# Patient Record
Sex: FEMALE | Race: WHITE
[De-identification: names, ages, dates, MRNs, and addresses within clinical notes are randomized per-mention and may not be internally consistent; named-entity substitution may affect disease eponyms.]

---

## 2019-11-08 ENCOUNTER — HOSPITAL ENCOUNTER (OUTPATIENT)
Dept: HOSPITAL 33 - MED SURG | Age: 39
Setting detail: OBSERVATION
LOS: 2 days | Discharge: HOME | End: 2019-11-10
Attending: INTERNAL MEDICINE | Admitting: INTERNAL MEDICINE
Payer: COMMERCIAL

## 2019-11-08 DIAGNOSIS — I95.1: Primary | ICD-10-CM

## 2019-11-08 DIAGNOSIS — R42: ICD-10-CM

## 2019-11-08 DIAGNOSIS — I10: ICD-10-CM

## 2019-11-08 LAB
ALBUMIN SERPL-MCNC: 3.9 G/DL (ref 3.5–5)
ALP SERPL-CCNC: 58 U/L (ref 38–126)
ALT SERPL-CCNC: 21 U/L (ref 0–35)
ANION GAP SERPL CALC-SCNC: 8.8 MEQ/L (ref 5–15)
AST SERPL QL: 29 U/L (ref 14–36)
BILIRUB BLD-MCNC: 0.5 MG/DL (ref 0.2–1.3)
BUN SERPL-MCNC: 22 MG/DL (ref 7–17)
CALCIUM SPEC-MCNC: 9.2 MG/DL (ref 8.4–10.2)
CHLORIDE SERPL-SCNC: 106 MMOL/L (ref 98–107)
CO2 SERPL-SCNC: 29 MMOL/L (ref 22–30)
CREAT SERPL-MCNC: 0.73 MG/DL (ref 0.52–1.04)
GLUCOSE SERPL-MCNC: 81 MG/DL (ref 74–106)
HCT VFR BLD AUTO: 38.8 % (ref 35–47)
HGB BLD-MCNC: 12.5 GM/DL (ref 12–16)
MAGNESIUM SERPL-MCNC: 1.8 MG/DL (ref 1.6–2.3)
MCH RBC QN AUTO: 28.1 PG (ref 26–32)
MCHC RBC AUTO-ENTMCNC: 32.2 G/DL (ref 32–36)
PLATELET # BLD AUTO: 246 K/MM3 (ref 150–450)
POTASSIUM SERPLBLD-SCNC: 4.2 MMOL/L (ref 3.5–5.1)
PROT SERPL-MCNC: 7 G/DL (ref 6.3–8.2)
RBC # BLD AUTO: 4.45 M/MM3 (ref 4.1–5.4)
SODIUM SERPL-SCNC: 140 MMOL/L (ref 137–145)
WBC # BLD AUTO: 5.7 K/MM3 (ref 4–10.5)

## 2019-11-08 PROCEDURE — G0378 HOSPITAL OBSERVATION PER HR: HCPCS

## 2019-11-08 PROCEDURE — 80053 COMPREHEN METABOLIC PANEL: CPT

## 2019-11-08 PROCEDURE — 83690 ASSAY OF LIPASE: CPT

## 2019-11-08 PROCEDURE — 93005 ELECTROCARDIOGRAM TRACING: CPT

## 2019-11-08 PROCEDURE — 83735 ASSAY OF MAGNESIUM: CPT

## 2019-11-08 PROCEDURE — 82150 ASSAY OF AMYLASE: CPT

## 2019-11-08 PROCEDURE — 70450 CT HEAD/BRAIN W/O DYE: CPT

## 2019-11-08 PROCEDURE — 85027 COMPLETE CBC AUTOMATED: CPT

## 2019-11-08 PROCEDURE — 93268 ECG RECORD/REVIEW: CPT

## 2019-11-08 PROCEDURE — 36415 COLL VENOUS BLD VENIPUNCTURE: CPT

## 2019-11-09 LAB
ALBUMIN SERPL-MCNC: 3.3 G/DL (ref 3.5–5)
ALP SERPL-CCNC: 53 U/L (ref 38–126)
ALT SERPL-CCNC: 16 U/L (ref 0–35)
AMYLASE SERPL-CCNC: 40 U/L (ref 30–110)
ANION GAP SERPL CALC-SCNC: 7 MEQ/L (ref 5–15)
AST SERPL QL: 20 U/L (ref 14–36)
BILIRUB BLD-MCNC: 0.4 MG/DL (ref 0.2–1.3)
BUN SERPL-MCNC: 13 MG/DL (ref 7–17)
CALCIUM SPEC-MCNC: 8 MG/DL (ref 8.4–10.2)
CHLORIDE SERPL-SCNC: 111 MMOL/L (ref 98–107)
CO2 SERPL-SCNC: 26 MMOL/L (ref 22–30)
CREAT SERPL-MCNC: 0.64 MG/DL (ref 0.52–1.04)
GLUCOSE SERPL-MCNC: 78 MG/DL (ref 74–106)
LIPASE SERPL-CCNC: 107 U/L (ref 23–300)
POTASSIUM SERPLBLD-SCNC: 3.9 MMOL/L (ref 3.5–5.1)
PROT SERPL-MCNC: 6.1 G/DL (ref 6.3–8.2)
SODIUM SERPL-SCNC: 141 MMOL/L (ref 137–145)

## 2019-11-09 RX ADMIN — HYDROCHLOROTHIAZIDE SCH MG: 25 TABLET ORAL at 11:53

## 2019-11-09 NOTE — XRAY
Indication: Dizziness and nausea.



Multiple contiguous axial images obtained through the head without contrast.



Comparison: None



Normal appearing brain parenchyma, ventricles, and bony calvarium.  Visualized

paranasal sinuses and mastoid air cells are clear.



Impression: Normal CT head without contrast exam.



Comment: Preliminary interpretation was made by [facility].  No discrepancy.



CTDI 61.96

## 2019-11-09 NOTE — PCM.SSS
History of Present Illness





- Chief Complaint


Chief Complaint: severe dizziness and syncopal episodes for last 1 day


History of Present Illness: 


 is a 38 year old female.With significant past medical history of 

recurrent orthostatic hypotension and syncopal episode, which workup has not 

reveal any significant findings, but more suggestive of autonomic hypotension.  

She was in her usual state of health and since yesterday night she passed out 

couple times and now she is feeling ready weak and dizzy.  She came to my office

, where she was also having a near syncopal episode, so she was admitted for 

further evaluation and the saline infusion.








- Review of Systems


Constitutional: No Fever, No Chills


Eyes: No Symptoms


Ears, Nose, & Throat: No Symptoms


Respiratory: No Cough, No Short Of Breath


Cardiac: Syncope, No Chest Pain, No Edema


Abdominal/Gastrointestinal: No Abdominal Pain, No Nausea, No Vomiting, No 

Diarrhea


Genitourinary Symptoms: No Dysuria


Musculoskeletal: No Back Pain, No Neck Pain


Skin: No Rash


Neurological: Dizziness, No Focal Weakness, No Sensory Changes


Psychological: No Symptoms


Endocrine: No Symptoms


Hematologic/Lymphatic: No Symptoms


Immunological/Allergic: No Symptoms





Medications & Allergies


Home Medications: 


 Home Medication List





Lisinopril/Hydrochlorothiazide [Lisinopril-Hctz 20-12.5 mg Tab] 1 tab PO DAILY 

16 [History Confirmed 19]








Allergies/Adverse Reactions: 


 Allergies











Allergy/AdvReac Type Severity Reaction Status Date / Time


 


No Known Drug Allergies Allergy   Verified 17 15:59














- Past Medical History


Past Medical History: Yes


Neurological History: Migraines


ENT History: No Pertinent History


Cardiac History: Hypertension


Respiratory History: No Pertinent History


Endocrine Medical History: No Pertinent History


Musculoskelatal History: No Pertinent History


GI Medical History: No Pertinent History


 History: No Pertinent History


Pyscho-Social History: No Pertinent History


Reproductive Disorders: No Pertinent History


Comment: Postural Orthostatic Tachycardial Syndrome





- Female History


Are you pregnant now?: No (tubal)





- Past Surgical History


Past Surgical History: Yes


Neuro Surgical History: No Pertinent History


Cardiac History: No Pertinent History


Respiratory Surgery: No Pertinent History


GI Surgical History: Appendectomy


Genitourinary Surgical Hx: No Pertinent History


Musculskeletal Surgical Hx: No Pertinent History


Female Surgical History:  Section, Tubal Ligation


Other Surgical History: spinal fusion L5-S1





- Social History


Smoking Status: Never smoker


Exposure to second hand smoke: No


Alcohol: None


Drug Use: none





- Physical Exam


Vital Signs: 


 Vital Signs - 24 hr











  Temp Pulse Resp BP Pulse Ox


 


 19 07:19  97.8 F  72  20  120/67  99


 


 19 04:14  98.1 F  72  16  109/63  97


 


 19 23:39  98.1 F  72  18  104/58  97


 


 19 20:00  98.0 F  87  17  117/58  96


 


 19 16:00  98.1 F  65  20  114/60  99


 


 19 12:30  98.1 F  66  18  142/72  99


 


 19 12:00  98.1 F  66  18  142/72  99











General Appearance: no apparent distress, alert


Neurologic Exam: alert, oriented x 3, cooperative, normal mood/affect, nml 

cerebellar function, nml station & gait, sensation nml, No motor deficits


Eye Exam: PERRL/EOMI, eyes nml inspection


Ears, Nose, Throat Exam: normal ENT inspection, TMs normal, pharynx normal, 

moist mucous membranes


Neck Exam: normal inspection, non-tender, supple, full range of motion


Respiratory Exam: normal breath sounds, lungs clear, No respiratory distress


Cardiovascular Exam: regular rate/rhythm, normal heart sounds, normal 

peripheral pulses


Gastrointestinal/Abdomen Exam: soft, normal bowel sounds, No tenderness, No mass


Back Exam: normal inspection, normal range of motion, No CVA tenderness, No 

vertebral tenderness


Extremity Exam: normal inspection, normal range of motion, pelvis stable


Skin Exam: normal color, warm, dry, No rash


Lymphatic Exam: No adenopathy





Results





- Labs


Lab/Micro Results: 


 Lab Results-Last 24 Hours











  19 Range/Units





  14:45 14:45 


 


WBC  5.7   (4.0-10.5)  K/mm3


 


RBC  4.45   (4.1-5.4)  M/mm3


 


Hgb  12.5   (12.0-16.0)  gm/dl


 


Hct  38.8   (35-47)  %


 


MCV  87.2   ()  fl


 


MCH  28.1   (26-32)  pg


 


MCHC  32.2   (32-36)  g/dl


 


RDW  13.7   (11.5-14.0)  %


 


Plt Count  246   (150-450)  K/mm3


 


MPV  11.5 H   (6-9.5)  fl


 


Sodium   140  (137-145)  mmol/L


 


Potassium   4.2  (3.5-5.1)  mmol/L


 


Chloride   106  ()  mmol/L


 


Carbon Dioxide   29  (22-30)  mmol/L


 


Anion Gap   8.8  (5-15)  MEQ/L


 


BUN   22 H  (7-17)  mg/dL


 


Creatinine   0.73  (0.52-1.04)  mg/dL


 


Estimated GFR   > 60.0  ML/MIN


 


Glucose   81  ()  mg/dL


 


Calcium   9.2  (8.4-10.2)  mg/dL


 


Magnesium   1.8  (1.6-2.3)  mg/dL


 


Total Bilirubin   0.50  (0.2-1.3)  mg/dL


 


AST   29  (14-36)  U/L


 


ALT   21  (0-35)  U/L


 


Alkaline Phosphatase   58  ()  U/L


 


Serum Total Protein   7.0  (6.3-8.2)  g/dL


 


Albumin   3.9  (3.5-5.0)  g/dL














- Radiology Impressions


Radiology Exams & Impressions: 





RAD/CHEST 2 VIEWS (PA AND LAT)


Indication: Left rib pain and migraines.





Comparison: 2013





PA/L chest again demonstrates normal heart, lungs, and bony thorax with a few


scattered calcified granulomas.





- Other Procedures and Tests


 Respiratory Therapy





19 08:02


EKG OM.NOW 














Assessment/Plan


(1) Autonomic postural hypotension


Current Visit: Yes   Status: Acute   


Assessment & Plan: 


 Chief Complaint





Diagnosis                        Orthostatic hypotension





 Allergies











Allergy/AdvReac Type Severity Reaction Status Date / Time


 


No Known Drug Allergies Allergy   Verified 17 15:59








 Vital Signs (Last 24 hours)











  Temp Pulse Resp BP Pulse Ox


 


 19 07:19  97.8 F  72  20  120/67  99


 


 19 04:14  98.1 F  72  16  109/63  97


 


 19 23:39  98.1 F  72  18  104/58  97


 


 19 20:00  98.0 F  87  17  117/58  96


 


 19 16:00  98.1 F  65  20  114/60  99


 


 19 12:30  98.1 F  66  18  142/72  99


 


 19 12:00  98.1 F  66  18  142/72  99








 Current Medications











Generic Name Dose Route Start Last Admin





  Trade Name Koltonq  PRN Reason Stop Dose Admin


 


Hydrochlorothiazide  12.5 mg  19 10:00  





  Hydrodiuril 25 Mg***  PO  19 09:59  





  DAILY RITCHIE   





     





     





     





     


 


Sodium Chloride  1,000 mls @ 150 mls/hr  19 21:00  19 05:20





  Sodium Chloride 0.9% 1000 Ml  IV  19 20:59  150 mls/hr





  .Q6H40M RITCHIE   Administration





     





     





     





     


 


Lisinopril  20 mg  19 10:00  





  Zestril 20 Mg***  PO  19 09:59  





  DAILY RITCHIE   





     





     





     





     














Discontinued Medications














Generic Name Dose Route Start Last Admin





  Trade Name Koltonq  PRN Reason Stop Dose Admin


 


Sodium Chloride  1,000 mls @ 999 mls/hr  19 15:30  19 17:06





  Sodium Chloride 0.9% 1000 Ml  IV  19 17:30  999 mls/hr





  .Q1H1M RITCHIE   Administration





     





     





     





     








 Intake & Output (Last 24 hours)











 19





 11:59 11:59 11:59 11:59


 


Intake Total    4145


 


Output Total    600


 


Balance    3545


 


Weight    113.1 kg








 Laboratory Results (Last 24 hours)











  19





  14:45 14:45


 


WBC   5.7


 


RBC   4.45


 


Hgb   12.5


 


Hct   38.8


 


MCV   87.2


 


MCH   28.1


 


MCHC   32.2


 


RDW   13.7


 


Plt Count   246


 


MPV   11.5 H


 


Sodium  140 


 


Potassium  4.2 


 


Chloride  106 


 


Carbon Dioxide  29 


 


Anion Gap  8.8 


 


BUN  22 H 


 


Creatinine  0.73 


 


Estimated GFR  > 60.0 


 


Glucose  81 


 


Calcium  9.2 


 


Magnesium  1.8 


 


Total Bilirubin  0.50 


 


AST  29 


 


ALT  21 


 


Alkaline Phosphatase  58 


 


Serum Total Protein  7.0 


 


Albumin  3.9 








 Orders (Last 24 hours)











 Category Date Time Status


 


 Telemetry q6h Care  19 00:39 Active


 


 Regular Diet Diet  19 Lunch Active


 


 CBC Urgent Lab  19 14:45 Completed


 


 CMP Urgent Lab  19 14:45 Completed


 


 MAG [MAGNESIUM] Urgent Lab  19 14:45 Completed


 


 Hydrochlorothiazide 25 mg*** [hydroDIURIL 25 MG***] Med  19 10:00 Active





 12.5 mg PO DAILY   


 


 Lisinopril 20 mg*** [Zestril 20 MG***] Med  19 10:00 Active





 20 mg PO DAILY   


 


 NaCl 0.9% 1000 ml [Sodium Chloride 0.9% 1000 ML] 1,000 Med  19 21:00 

Active





 ml   





  mls/hr   


 


 NaCl 0.9% 1000 ml [Sodium Chloride 0.9% 1000 ML] 1,000 Med  19 15:30 

Discontinued





 ml   





  mls/hr   


 


 EKG OM.NOW RT  19 08:02 Active








 Patient Care Notes (Last 24 hours)





19 21:05 (created 19 01:09) Nursing Note by Tarun Velasquez


Pt c/o heavy pain in her chest, 5/10 and headache.  States feels like a weight 

is on her chest.  /70, HR 73, O2 sat 99% on RA.  Episode was subsiding by 

the time her vitals were taken.  Upon reassessment, pt states that the pain is 

gone.  She states that this is the same feeling she always gets with these 

episodes.





Initialized on 19 01:09 - END OF NOTE














Code(s): I95.1 - ORTHOSTATIC HYPOTENSION   





(2) Syncope due to orthostatic hypotension


Current Visit: Yes   Status: Acute   Code(s): I95.1 - ORTHOSTATIC HYPOTENSION   





Hospital Summary





- Hospital Course


Hospital Course: 





 Last Vital Signs











Temp  97.8 F   19 07:19


 


Pulse  72   19 07:19


 


Resp  20   19 07:19


 


BP  120/67   19 07:19


 


Pulse Ox  99   19 07:19








Allergies





No Known Drug Allergies Allergy (Verified 17 15:59)


 





Active Medications





Hydrochlorothiazide (Hydrodiuril 25 Mg***)  12.5 mg PO DAILY RITCHIE


   Stop: 19 09:59


Sodium Chloride (Sodium Chloride 0.9% 1000 Ml)  1,000 mls @ 150 mls/hr IV 

.Q6H40M RITCHIE


   Stop: 19 20:59


   Last Admin: 19 05:20 Dose:  150 mls/hr


Lisinopril (Zestril 20 Mg***)  20 mg PO DAILY RITCHIE


   Stop: 19 09:59





 Intake & Output











 19





 11:59 11:59


 


Intake Total  4145


 


Output Total  600


 


Balance  3545


 


Weight  113.1 kg








 Orders





19 21:00


NaCl 0.9% 1000 ml [Sodium Chloride 0.9% 1000 ML] 1,000 ml  mls/hr 





19 Lunch


Regular Diet 





19 08:02


EKG OM.NOW 





19 10:00


Hydrochlorothiazide 25 mg*** [hydroDIURIL 25 MG***]   12.5 mg PO DAILY 


Lisinopril 20 mg*** [Zestril 20 MG***]   20 mg PO DAILY 








 Lab Tests











  19





  14:45 14:45


 


WBC  5.7 


 


RBC  4.45 


 


Hgb  12.5 


 


Hct  38.8 


 


MCV  87.2 


 


MCH  28.1 


 


MCHC  32.2 


 


RDW  13.7 


 


Plt Count  246 


 


MPV  11.5 H 


 


Sodium   140


 


Potassium   4.2


 


Chloride   106


 


Carbon Dioxide   29


 


Anion Gap   8.8


 


BUN   22 H


 


Creatinine   0.73


 


Estimated GFR   > 60.0


 


Glucose   81


 


Calcium   9.2


 


Magnesium   1.8


 


Total Bilirubin   0.50


 


AST   29


 


ALT   21


 


Alkaline Phosphatase   58


 


Serum Total Protein   7.0


 


Albumin   3.9














- Vitals & Intake/Output


Vital Signs: 


 Vital Signs











Temperature  97.8 F   19 07:19


 


Pulse Rate  72   19 07:19


 


Respiratory Rate  20   19 07:19


 


Blood Pressure  120/67   19 07:19


 


O2 Sat by Pulse Oximetry  99   19 07:19











Intake & Output: 


 Intake & Output











 19





 11:59 11:59 11:59 11:59


 


Intake Total    4145


 


Output Total    600


 


Balance    3545


 


Weight    113.1 kg














- Lab


Result Diagrams: 


 19 14:45





 19 14:45


Lab Results-Last 24 Hrs: 


 Lab Results-Last 24 Hours











  19 Range/Units





  14:45 14:45 


 


WBC  5.7   (4.0-10.5)  K/mm3


 


RBC  4.45   (4.1-5.4)  M/mm3


 


Hgb  12.5   (12.0-16.0)  gm/dl


 


Hct  38.8   (35-47)  %


 


MCV  87.2   ()  fl


 


MCH  28.1   (26-32)  pg


 


MCHC  32.2   (32-36)  g/dl


 


RDW  13.7   (11.5-14.0)  %


 


Plt Count  246   (150-450)  K/mm3


 


MPV  11.5 H   (6-9.5)  fl


 


Sodium   140  (137-145)  mmol/L


 


Potassium   4.2  (3.5-5.1)  mmol/L


 


Chloride   106  ()  mmol/L


 


Carbon Dioxide   29  (22-30)  mmol/L


 


Anion Gap   8.8  (5-15)  MEQ/L


 


BUN   22 H  (7-17)  mg/dL


 


Creatinine   0.73  (0.52-1.04)  mg/dL


 


Estimated GFR   > 60.0  ML/MIN


 


Glucose   81  ()  mg/dL


 


Calcium   9.2  (8.4-10.2)  mg/dL


 


Magnesium   1.8  (1.6-2.3)  mg/dL


 


Total Bilirubin   0.50  (0.2-1.3)  mg/dL


 


AST   29  (14-36)  U/L


 


ALT   21  (0-35)  U/L


 


Alkaline Phosphatase   58  ()  U/L


 


Serum Total Protein   7.0  (6.3-8.2)  g/dL


 


Albumin   3.9  (3.5-5.0)  g/dL














- Procedures and Test


Procedures and Tests throughout Hospitalization: 


 Therapy Orders & Screens





19 08:02


EKG OM.NOW 


   Comment: 


   Diagnosis: Orthostatic hypotension














- Discharge


Discharge Date: 19


Disposition: Home, Self-Care


Condition: Stable


Prescriptions: 


Continue


   Lisinopril/Hydrochlorothiazide [Lisinopril-Hctz 20-12.5 mg Tab] 1 tab PO 

DAILY


Follow up with: 


CLEMENTE CALERO MD [Primary Care Provider] - 19 2:00 pm (at [facility])


Forms:  Work/School Release Form

## 2019-11-10 VITALS — SYSTOLIC BLOOD PRESSURE: 131 MMHG | DIASTOLIC BLOOD PRESSURE: 66 MMHG | OXYGEN SATURATION: 100 % | HEART RATE: 66 BPM

## 2019-11-10 RX ADMIN — HYDROCHLOROTHIAZIDE SCH MG: 25 TABLET ORAL at 10:19

## 2019-11-10 RX ADMIN — MECLIZINE HYDROCHLORIDE SCH MG: 25 TABLET ORAL at 08:43

## 2019-11-10 RX ADMIN — MECLIZINE HYDROCHLORIDE SCH MG: 25 TABLET ORAL at 14:33

## 2019-11-10 RX ADMIN — MECLIZINE HYDROCHLORIDE SCH: 25 TABLET ORAL at 10:54

## 2019-11-10 NOTE — PCM.NOTE
Date and Time: 11/10/19  0759





Subjective Assessment: 





still some episodes of lightheadedness





- Review of Systems


Constitutional: No Fever, No Chills


Eyes: No Symptoms


Ears, Nose, & Throat: No Symptoms


Respiratory: No Cough, No Short Of Breath


Cardiac: No Chest Pain, No Edema, No Syncope


Abdominal/Gastrointestinal: No Abdominal Pain, No Nausea, No Vomiting, No 

Diarrhea


Genitourinary Symptoms: No Dysuria


Musculoskeletal: No Back Pain, No Neck Pain


Skin: No Rash


Neurological: No Dizziness, No Focal Weakness, No Sensory Changes


Psychological: No Symptoms


Endocrine: No Symptoms


Hematologic/Lymphatic: No Symptoms


Immunological/Allergic: No Symptoms





Objective Exam


General Appearance: no apparent distress, alert


Neurologic Exam: alert, oriented x 3, cooperative, normal mood/affect, nml 

cerebellar function, sensation nml, No motor deficits


Skin Exam: normal color, warm, dry


Eye Exam: PERRL, EOMI, eyes nml inspection


Ears, Nose, Throat Exam: normal ENT inspection, pharynx normal, moist mucous 

membranes


Neck Exam: normal inspection, non-tender, supple, full range of motion


Respiratory Exam: normal breath sounds, lungs clear, No respiratory distress


Cardiovascular Exam: regular rate/rhythm, normal heart sounds


Gastrointestinal/Abdomen Exam: soft, No tenderness, No mass


Extremity Exam: normal inspection, normal range of motion


Back Exam: normal inspection, normal range of motion, No CVA tenderness, No 

vertebral tenderness


Pelvic Exam: deferred


Rectal Exam: deferred





OBJECTIVE DATA


Vital Signs: 


 Vital Signs - 24 hr











  Temp Pulse Resp BP Pulse Ox


 


 11/10/19 07:03  98.0 F  74  17  128/72  99


 


 11/10/19 04:00    18  


 


 11/10/19 03:55  97.8 F  73  18  129/80  99


 


 11/10/19 00:00    16  


 


 11/09/19 23:26  97.9 F  83  16  101/52  99


 


 11/09/19 20:00    16  


 


 11/09/19 19:58  98.1 F  89  16  103/58  98


 


 11/09/19 16:03  97.8 F  60  20  133/79  99


 


 11/09/19 12:26   66  20  139/65  99








 Pain Assessment - Last Documented











Pain Intensity                 0


 


Pain Scale Used                0-10 Pain Scale











Intake and Output: 


 Intake & Output











 11/07/19 11/08/19 11/09/19 11/10/19





 11:59 11:59 11:59 11:59


 


Intake Total   4502 5416


 


Output Total   600 1500


 


Balance   3904 3944


 


Weight   113.1 kg 











Lab Results: 


 Lab Results-Last 24 Hours











  11/09/19 11/09/19 Range/Units





  12:49 12:49 


 


Sodium  141   (137-145)  mmol/L


 


Potassium  3.9   (3.5-5.1)  mmol/L


 


Chloride  111 H   ()  mmol/L


 


Carbon Dioxide  26   (22-30)  mmol/L


 


Anion Gap  7.0   (5-15)  MEQ/L


 


BUN  13   (7-17)  mg/dL


 


Creatinine  0.64   (0.52-1.04)  mg/dL


 


Estimated GFR  > 60.0   ML/MIN


 


Glucose  78   ()  mg/dL


 


Calcium  8.0 L   (8.4-10.2)  mg/dL


 


Total Bilirubin  0.40   (0.2-1.3)  mg/dL


 


AST  20   (14-36)  U/L


 


ALT  16   (0-35)  U/L


 


Alkaline Phosphatase  53   ()  U/L


 


Serum Total Protein  6.1 L   (6.3-8.2)  g/dL


 


Albumin  3.3 L   (3.5-5.0)  g/dL


 


Amylase   40  ()  U/L


 


Lipase   107  ()  U/L











Radiology Exams: 


 Radiology Procedures











 Category Date Time Status


 


 HEAD WITHOUT CONTRAST [CT] Urgent Exams  11/09/19 11:49 Completed














Assessment/Plan


(1) Autonomic postural hypotension


Current Visit: Yes   Status: Acute   Code(s): I95.1 - ORTHOSTATIC HYPOTENSION   





(2) Syncope due to orthostatic hypotension


Current Visit: Yes   Status: Acute   Code(s): I95.1 - ORTHOSTATIC HYPOTENSION

## 2020-10-15 ENCOUNTER — HOSPITAL ENCOUNTER (OUTPATIENT)
Dept: HOSPITAL 33 - MED SURG | Age: 40
Setting detail: OBSERVATION
LOS: 2 days | Discharge: HOME | End: 2020-10-17
Attending: INTERNAL MEDICINE | Admitting: INTERNAL MEDICINE
Payer: COMMERCIAL

## 2020-10-15 DIAGNOSIS — Z79.899: ICD-10-CM

## 2020-10-15 DIAGNOSIS — I49.8: ICD-10-CM

## 2020-10-15 DIAGNOSIS — G90.9: ICD-10-CM

## 2020-10-15 DIAGNOSIS — I95.1: Primary | ICD-10-CM

## 2020-10-15 LAB
ALBUMIN SERPL-MCNC: 4.3 G/DL (ref 3.5–5)
ALP SERPL-CCNC: 74 U/L (ref 38–126)
ALT SERPL-CCNC: 16 U/L (ref 0–35)
ANION GAP SERPL CALC-SCNC: 7.8 MEQ/L (ref 5–15)
AST SERPL QL: 22 U/L (ref 14–36)
BILIRUB BLD-MCNC: 0.5 MG/DL (ref 0.2–1.3)
BUN SERPL-MCNC: 23 MG/DL (ref 7–17)
CALCIUM SPEC-MCNC: 9.3 MG/DL (ref 8.4–10.2)
CHLORIDE SERPL-SCNC: 110 MMOL/L (ref 98–107)
CO2 SERPL-SCNC: 24 MMOL/L (ref 22–30)
CREAT SERPL-MCNC: 1.01 MG/DL (ref 0.52–1.04)
GFR SERPLBLD BASED ON 1.73 SQ M-ARVRAT: > 60 ML/MIN
GLUCOSE SERPL-MCNC: 87 MG/DL (ref 74–106)
GLUCOSE UR-MCNC: NEGATIVE MG/DL
HCT VFR BLD AUTO: 41.4 % (ref 35–47)
HGB BLD-MCNC: 13.2 GM/DL (ref 12–16)
MAGNESIUM SERPL-MCNC: 2.1 MG/DL (ref 1.6–2.3)
MCH RBC QN AUTO: 28.2 PG (ref 26–32)
MCHC RBC AUTO-ENTMCNC: 31.9 G/DL (ref 32–36)
PHOSPHATE SERPL-SCNC: 3.3 MG/DL (ref 2.5–4.5)
PLATELET # BLD AUTO: 259 K/MM3 (ref 150–450)
POTASSIUM SERPLBLD-SCNC: 3.9 MMOL/L (ref 3.5–5.1)
PROT SERPL-MCNC: 7.2 G/DL (ref 6.3–8.2)
PROT UR STRIP-MCNC: NEGATIVE MG/DL
RBC # BLD AUTO: 4.68 M/MM3 (ref 4.1–5.4)
RBC #/AREA URNS HPF: (no result) /HPF (ref 0–2)
SODIUM SERPL-SCNC: 138 MMOL/L (ref 137–145)
WBC # BLD AUTO: 5.2 K/MM3 (ref 4–10.5)
WBC #/AREA URNS HPF: (no result) /HPF (ref 0–5)

## 2020-10-15 PROCEDURE — 81001 URINALYSIS AUTO W/SCOPE: CPT

## 2020-10-15 PROCEDURE — 82375 ASSAY CARBOXYHB QUANT: CPT

## 2020-10-15 PROCEDURE — 84100 ASSAY OF PHOSPHORUS: CPT

## 2020-10-15 PROCEDURE — 36600 WITHDRAWAL OF ARTERIAL BLOOD: CPT

## 2020-10-15 PROCEDURE — 83735 ASSAY OF MAGNESIUM: CPT

## 2020-10-15 PROCEDURE — 36415 COLL VENOUS BLD VENIPUNCTURE: CPT

## 2020-10-15 PROCEDURE — 71046 X-RAY EXAM CHEST 2 VIEWS: CPT

## 2020-10-15 PROCEDURE — 80053 COMPREHEN METABOLIC PANEL: CPT

## 2020-10-15 PROCEDURE — 82962 GLUCOSE BLOOD TEST: CPT

## 2020-10-15 PROCEDURE — 82803 BLOOD GASES ANY COMBINATION: CPT

## 2020-10-15 PROCEDURE — 85027 COMPLETE CBC AUTOMATED: CPT

## 2020-10-15 PROCEDURE — 95812 EEG 41-60 MINUTES: CPT

## 2020-10-15 PROCEDURE — G0378 HOSPITAL OBSERVATION PER HR: HCPCS

## 2020-10-15 RX ADMIN — METOPROLOL SUCCINATE SCH MG: 50 TABLET, EXTENDED RELEASE ORAL at 20:52

## 2020-10-15 RX ADMIN — IBUPROFEN PRN MG: 400 TABLET ORAL at 16:03

## 2020-10-15 RX ADMIN — MIDODRINE HYDROCHLORIDE SCH MG: 5 TABLET ORAL at 18:38

## 2020-10-15 RX ADMIN — MIDODRINE HYDROCHLORIDE SCH MG: 5 TABLET ORAL at 23:00

## 2020-10-15 RX ADMIN — TOPIRAMATE SCH MG: 50 TABLET, FILM COATED ORAL at 20:51

## 2020-10-15 NOTE — XRAY
Indication: Cough and congestion one month.



Comparison: November 3, 2016.



PA/lateral chest again demonstrates normal heart, lungs, and bony thorax with

a few incidental calcified granulomas.

## 2020-10-16 LAB
ALBUMIN SERPL-MCNC: 3.7 G/DL (ref 3.5–5)
ALP SERPL-CCNC: 63 U/L (ref 38–126)
ALT SERPL-CCNC: 13 U/L (ref 0–35)
ANION GAP SERPL CALC-SCNC: 5.7 MEQ/L (ref 5–15)
ARTERIAL PATENCY WRIST A: YES
AST SERPL QL: 18 U/L (ref 14–36)
BASE EXCESS BLDA CALC-SCNC: -2.1 MMOL/L (ref -2–2)
BILIRUB BLD-MCNC: 0.5 MG/DL (ref 0.2–1.3)
BUN SERPL-MCNC: 15 MG/DL (ref 7–17)
CALCIUM SPEC-MCNC: 8.5 MG/DL (ref 8.4–10.2)
CHLORIDE SERPL-SCNC: 114 MMOL/L (ref 98–107)
CO2 SERPL-SCNC: 20 MMOL/L (ref 22–30)
COHGB BLD-MCNC: 2.6 % THGB (ref 0–6.9)
CREAT SERPL-MCNC: 0.79 MG/DL (ref 0.52–1.04)
GAS PNL BLDA: 12.7
GAS PNL BLDA: 37 C
GFR SERPLBLD BASED ON 1.73 SQ M-ARVRAT: > 60 ML/MIN
GLUCOSE SERPL-MCNC: 94 MG/DL (ref 74–106)
HCO3 BLDA-SCNC: 21.1 MMOL/L (ref 22–28)
INHALED O2 CONCENTRATION: 21 %
MAGNESIUM SERPL-MCNC: 2.1 MG/DL (ref 1.6–2.3)
METHGB MFR BLDA: 0.9 % (ref 1.4–1.5)
O2 A-A PPRESDIFF RESPIRATORY: -45 MM[HG]
PCO2 BLDA: 31 MMHG (ref 35–45)
PO2 BLDA: 156 MMHG (ref 75–100)
POTASSIUM BLDA-SCNC: 4 MMOL/L (ref 3.5–5.1)
POTASSIUM SERPLBLD-SCNC: 3.9 MMOL/L (ref 3.5–5.1)
PROT SERPL-MCNC: 6.2 G/DL (ref 6.3–8.2)
SAO2 % BLDA FROM PO2: 1.41 %
SAO2 % BLDA: 96.2 G/DF (ref 94–100)
SAO2 % BLDA: 99.7 % (ref 95–100)
SODIUM SERPL-SCNC: 136 MMOL/L (ref 137–145)
SPECIMEN SOURCE: (no result)

## 2020-10-16 RX ADMIN — IBUPROFEN PRN MG: 400 TABLET ORAL at 09:34

## 2020-10-16 RX ADMIN — METOPROLOL SUCCINATE SCH MG: 50 TABLET, EXTENDED RELEASE ORAL at 21:41

## 2020-10-16 RX ADMIN — MIDODRINE HYDROCHLORIDE SCH MG: 5 TABLET ORAL at 09:29

## 2020-10-16 RX ADMIN — TOPIRAMATE SCH MG: 50 TABLET, FILM COATED ORAL at 09:29

## 2020-10-16 RX ADMIN — MIDODRINE HYDROCHLORIDE SCH MG: 5 TABLET ORAL at 16:06

## 2020-10-16 RX ADMIN — MIDODRINE HYDROCHLORIDE SCH MG: 5 TABLET ORAL at 21:42

## 2020-10-16 RX ADMIN — TOPIRAMATE SCH MG: 50 TABLET, FILM COATED ORAL at 21:41

## 2020-10-17 VITALS — HEART RATE: 57 BPM | DIASTOLIC BLOOD PRESSURE: 77 MMHG | SYSTOLIC BLOOD PRESSURE: 138 MMHG | OXYGEN SATURATION: 99 %

## 2020-10-17 RX ADMIN — TOPIRAMATE SCH MG: 50 TABLET, FILM COATED ORAL at 09:25

## 2020-10-17 RX ADMIN — MIDODRINE HYDROCHLORIDE SCH MG: 5 TABLET ORAL at 09:26

## 2020-10-17 NOTE — PCM.DS
Discharge Summary


Date of Admission: 


10/15/20 11:56





Admitting Physician: 


CLEMENTE CALERO





Primary Care Provider: 


CLEMENTE CALERO








Allergies


Allergies





No Known Drug Allergies Allergy (Verified 06/29/20 16:23)


   











Hospital Summary





- Hospital Course


Hospital Course: 





Pt is a 40 yo female pt of Dr. Calero who was directly admitted by him through 

the office for IV fluids; pt suffers from POTS (postural orthostatic tachycardia

syndrome).  Was also started on po midodrine 5mg TID here.  





She had some nausea this morning but no vomiting.  Was tachypneic yesterday and 

had ABG which was non acute.  She is not feeling great this morning but agrees 

with going home.  Her labs have been nonacute.  Continue midodrine. She will f/u

with Dr. Calero in office within 1 week. 





- Vitals & Intake/Output


Vital Signs: 





                                   Vital Signs











Temperature  98.3 F   10/17/20 11:28


 


Pulse Rate  57 L  10/17/20 11:28


 


Respiratory Rate  18   10/17/20 11:28


 


Blood Pressure  138/77   10/17/20 11:28


 


O2 Sat by Pulse Oximetry  99   10/17/20 11:28











Intake & Output: 





                                 Intake & Output











 10/15/20 10/16/20 10/17/20 10/18/20





 11:59 11:59 11:59 11:59


 


Intake Total  4100 3690 


 


Output Total  1820 2300 


 


Balance  2280 1390 


 


Weight  111 kg  














- Lab


Result Diagrams: 


                                 10/15/20 13:39





                                 10/16/20 11:06


Lab Results-Last 24 Hrs: 





                            Lab Results-Last 24 Hours











  10/16/20 10/17/20 Range/Units





  11:00 08:00 


 


Puncture Site  RIGHT RADIAL   


 


pCO2  31 L   (35-45)  mmHg


 


pO2  156 H*   ()  mmHg


 


Base Excess  -2.1 L   (-2.0-2.0)  


 


O2 Saturation  96.2   ()  g/dF


 


ABG pH  7.44   (7.35-7.45)  


 


ABG HCO3  21.1 L   (22-28)  


 


ABG O2 Sat (Measured)  99.7   ()  %


 


Jose De Jesus Test  YES   


 


A-a Gradient  -45   


 


a/A Ratio  1.41   


 


Hemoglobin  12.7   


 


Carboxyhemoglobin  2.6   (0.0-6.9)  % THgb


 


Methemoglobin  0.9 L   (1.4-1.5)  %


 


Potassium  4.0   (3.5-5.1)  


 


Temperature  37.0   C


 


POC O2 Flow Rate  21   %


 


POC Glucometer   71 L  (74 to 106)  mg/dL














- Radiology Exams


Ordered Rad Exams-Entire Visit: 





                              Radiology Procedures











 Category Date Time Status


 


 CHEST 2 VIEWS (PA AND LAT) Routine Exams  10/15/20 13:19 Completed














- Procedures and Test


Procedures and Tests throughout Hospitalization: 





                            Therapy Orders & Screens





10/16/20 12:45


EEG 41-60 Minutes (Normal) ONCE 


   Comment: 


   Reason For Exam: seizure-like activity; dizziness


   Diagnosis: dizziness














Discharge Exam


General Appearance: no apparent distress, alert


Neurologic Exam: oriented x 3, cooperative


Eye Exam: eyes nml inspection


Ears, Nose, Throat Exam: moist mucous membranes


Neck Exam: normal inspection


Respiratory Exam: normal breath sounds, lungs clear, No crackles/rales, No 

rhonchi, No wheezing


Cardiovascular Exam: regular rate/rhythm, normal heart sounds, No murmur


Gastrointestinal/Abdomen Exam: soft, No normal bowel sounds (decreased but pr

esent), No tenderness, No distention, No mass, No guarding, No rebound


Back Exam: normal inspection, No rash


Extremity Exam: normal inspection, No swelling, No tenderness


Skin Exam: normal color, warm, dry, No rash





Final Diagnosis/Problem List





- Final Discharge Diagnosis/Problem


(1) Autonomic postural hypotension


Current Visit: No   Status: Acute   


Assessment & Plan: 


Received IV fluids, states she never feels that great it just depends on her 

position.  Labs non acute.  Will send pt home and she is to f/u with Dr. Calero.  Should return to ER for any worrisome change in sx including but not 

limited to SOB, fever, or syncope. 


Code(s): I95.1 - ORTHOSTATIC HYPOTENSION   





- Discharge


Disposition: Home, Self-Care


Condition: Stable


Prescriptions: 


New


   Midodrine HCl 5 mg*** [Proamatine 5 mg***] 5 mg PO TID #21 tablet


   Ondansetron ODT 4 MG*** [Zofran Odt 4 mg***] 4 mg PO Q6H PRN PRN #30 

tab.rapdis


     PRN Reason: Nausea





Continue


   Metoprolol Succinate 50 mg*** [Toprol Xl 50 MG***] 50 mg PO HS


   Topiramate [Topamax] 100 mg PO BID


   Pyridostigmine Bromide [Mestinon] 60 mg PO TID


Instructions:  Orthostatic Hypotension (DC), Dizziness, Nonvertigo, (DC), 

Tachycardia (DC)


Follow up with: 


CLEMENTE CALERO MD [Primary Care Provider] - Call for Appointment